# Patient Record
(demographics unavailable — no encounter records)

---

## 2024-10-29 NOTE — HISTORY OF PRESENT ILLNESS
[FreeTextEntry8] : 37F presents to discuss URI sxms. reports mild fatigue cough initially that progressed to frequent dry cough congestion and fevers. mild imporvement with tylenol. denies chest pain, wheezing, sob, [place].

## 2024-10-29 NOTE — ASSESSMENT
[FreeTextEntry1] : URI >7days with fever - did not test for covid  - amoxicillin 875mg q12hr - benozonate prn for cough - cont w/ otc symptomatic mgmt

## 2024-12-05 NOTE — HISTORY OF PRESENT ILLNESS
[Home] : at home, [unfilled] , at the time of the visit. [Medical Office: ([facility])___] : at the medical office located in  [Verbal consent obtained from patient] : the patient, [unfilled] [FreeTextEntry8] : The pt has been experiencing sore throat, nasal congestion and cough for the last 2 days. Unable to sleep at night. Did not test for COVID. No sick contacts.

## 2024-12-05 NOTE — HISTORY OF PRESENT ILLNESS
[Home] : at home, [unfilled] , at the time of the visit. [Medical Office: ([facility])___] : at the medical office located in  [Verbal consent obtained from patient] : the patient, [unfilled] [FreeTextEntry8] : The pt has been experiencing sore throat, nasal congestion and cough for the last 2 days. Unable to sleep at night. Did not test for COVID. No sick contacts.